# Patient Record
Sex: MALE | Race: WHITE | Employment: UNEMPLOYED | URBAN - METROPOLITAN AREA
[De-identification: names, ages, dates, MRNs, and addresses within clinical notes are randomized per-mention and may not be internally consistent; named-entity substitution may affect disease eponyms.]

---
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
## 2022-01-12 ENCOUNTER — HOSPITAL ENCOUNTER (EMERGENCY)
Age: 22
Discharge: HOME | End: 2022-01-12
Attending: EMERGENCY MEDICINE
Payer: MEDICAID

## 2022-01-12 ENCOUNTER — APPOINTMENT (OUTPATIENT)
Dept: GENERAL RADIOLOGY | Age: 22
End: 2022-01-12
Payer: MEDICAID

## 2022-01-12 VITALS
WEIGHT: 165 LBS | BODY MASS INDEX: 27.49 KG/M2 | TEMPERATURE: 96.9 F | OXYGEN SATURATION: 94 % | HEIGHT: 65 IN | SYSTOLIC BLOOD PRESSURE: 115 MMHG | RESPIRATION RATE: 18 BRPM | DIASTOLIC BLOOD PRESSURE: 74 MMHG | HEART RATE: 106 BPM

## 2022-01-12 DIAGNOSIS — M79.605 LEFT LEG PAIN: ICD-10-CM

## 2022-01-12 DIAGNOSIS — R07.89 OTHER CHEST PAIN: Primary | ICD-10-CM

## 2022-01-12 LAB
ABSOLUTE EOS #: 0.03 K/UL (ref 0–0.44)
ABSOLUTE IMMATURE GRANULOCYTE: 0.04 K/UL (ref 0–0.3)
ABSOLUTE LYMPH #: 1.43 K/UL (ref 1.2–5.2)
ABSOLUTE MONO #: 0.8 K/UL (ref 0.1–1.4)
ANION GAP SERPL CALCULATED.3IONS-SCNC: 14 MMOL/L (ref 9–17)
BASOPHILS # BLD: 1 % (ref 0–2)
BASOPHILS ABSOLUTE: 0.08 K/UL (ref 0–0.2)
BILIRUBIN URINE: NEGATIVE
BUN BLDV-MCNC: 10 MG/DL (ref 6–20)
BUN/CREAT BLD: ABNORMAL (ref 9–20)
CALCIUM SERPL-MCNC: 9.8 MG/DL (ref 8.6–10.4)
CHLORIDE BLD-SCNC: 97 MMOL/L (ref 98–107)
CO2: 25 MMOL/L (ref 20–31)
COLOR: YELLOW
COMMENT UA: ABNORMAL
CREAT SERPL-MCNC: 0.91 MG/DL (ref 0.7–1.2)
D-DIMER QUANTITATIVE: 0.3 MG/L FEU
DIFFERENTIAL TYPE: ABNORMAL
EOSINOPHILS RELATIVE PERCENT: 0 % (ref 1–4)
GFR AFRICAN AMERICAN: ABNORMAL ML/MIN
GFR NON-AFRICAN AMERICAN: ABNORMAL ML/MIN
GFR SERPL CREATININE-BSD FRML MDRD: ABNORMAL ML/MIN/{1.73_M2}
GFR SERPL CREATININE-BSD FRML MDRD: ABNORMAL ML/MIN/{1.73_M2}
GLUCOSE BLD-MCNC: 106 MG/DL (ref 70–99)
GLUCOSE URINE: NEGATIVE
HCT VFR BLD CALC: 43.6 % (ref 40.7–50.3)
HEMOGLOBIN: 14.6 G/DL (ref 13–17)
IMMATURE GRANULOCYTES: 1 %
KETONES, URINE: NEGATIVE
LEUKOCYTE ESTERASE, URINE: NEGATIVE
LYMPHOCYTES # BLD: 17 % (ref 25–45)
MCH RBC QN AUTO: 29.7 PG (ref 25.2–33.5)
MCHC RBC AUTO-ENTMCNC: 33.5 G/DL (ref 28.4–34.8)
MCV RBC AUTO: 88.8 FL (ref 82.6–102.9)
MONOCYTES # BLD: 9 % (ref 2–8)
NITRITE, URINE: NEGATIVE
NRBC AUTOMATED: 0 PER 100 WBC
PDW BLD-RTO: 12.5 % (ref 11.8–14.4)
PH UA: 6.5 (ref 5–8)
PLATELET # BLD: 309 K/UL (ref 138–453)
PLATELET ESTIMATE: ABNORMAL
PMV BLD AUTO: 10.4 FL (ref 8.1–13.5)
POTASSIUM SERPL-SCNC: 3.9 MMOL/L (ref 3.7–5.3)
PROTEIN UA: NEGATIVE
RBC # BLD: 4.91 M/UL (ref 4.21–5.77)
RBC # BLD: ABNORMAL 10*6/UL
SEG NEUTROPHILS: 72 % (ref 34–64)
SEGMENTED NEUTROPHILS ABSOLUTE COUNT: 6.15 K/UL (ref 1.8–8)
SODIUM BLD-SCNC: 136 MMOL/L (ref 135–144)
SPECIFIC GRAVITY UA: 1 (ref 1–1.03)
TROPONIN INTERP: NORMAL
TROPONIN T: NORMAL NG/ML
TROPONIN, HIGH SENSITIVITY: <6 NG/L (ref 0–22)
TURBIDITY: CLEAR
URINE HGB: NEGATIVE
UROBILINOGEN, URINE: NORMAL
WBC # BLD: 8.5 K/UL (ref 4.5–13.5)
WBC # BLD: ABNORMAL 10*3/UL

## 2022-01-12 PROCEDURE — 81003 URINALYSIS AUTO W/O SCOPE: CPT

## 2022-01-12 PROCEDURE — 96360 HYDRATION IV INFUSION INIT: CPT

## 2022-01-12 PROCEDURE — 99283 EMERGENCY DEPT VISIT LOW MDM: CPT

## 2022-01-12 PROCEDURE — 84484 ASSAY OF TROPONIN QUANT: CPT

## 2022-01-12 PROCEDURE — 85025 COMPLETE CBC W/AUTO DIFF WBC: CPT

## 2022-01-12 PROCEDURE — 2580000003 HC RX 258: Performed by: STUDENT IN AN ORGANIZED HEALTH CARE EDUCATION/TRAINING PROGRAM

## 2022-01-12 PROCEDURE — 80048 BASIC METABOLIC PNL TOTAL CA: CPT

## 2022-01-12 PROCEDURE — 96361 HYDRATE IV INFUSION ADD-ON: CPT

## 2022-01-12 PROCEDURE — 71045 X-RAY EXAM CHEST 1 VIEW: CPT

## 2022-01-12 PROCEDURE — 85379 FIBRIN DEGRADATION QUANT: CPT

## 2022-01-12 PROCEDURE — 93005 ELECTROCARDIOGRAM TRACING: CPT | Performed by: STUDENT IN AN ORGANIZED HEALTH CARE EDUCATION/TRAINING PROGRAM

## 2022-01-12 RX ORDER — 0.9 % SODIUM CHLORIDE 0.9 %
1000 INTRAVENOUS SOLUTION INTRAVENOUS ONCE
Status: COMPLETED | OUTPATIENT
Start: 2022-01-12 | End: 2022-01-12

## 2022-01-12 RX ADMIN — SODIUM CHLORIDE 1000 ML: 9 INJECTION, SOLUTION INTRAVENOUS at 17:07

## 2022-01-12 ASSESSMENT — PAIN DESCRIPTION - LOCATION: LOCATION: CHEST;LEG

## 2022-01-12 ASSESSMENT — PAIN DESCRIPTION - ORIENTATION: ORIENTATION: LEFT

## 2022-01-12 ASSESSMENT — PAIN SCALES - GENERAL: PAINLEVEL_OUTOF10: 8

## 2022-01-12 NOTE — ED PROVIDER NOTES
[facility]     Emergency Department     Faculty Attestation        I performed a history and physical examination of the patient and discussed management with the resident. I reviewed the residents note and agree with the documented findings and plan of care. Any areas of disagreement are noted on the chart. I was personally present for the key portions of any procedures. I have documented in the chart those procedures where I was not present during the key portions. I have reviewed the emergency nurses triage note. I agree with the chief complaint, past medical history, past surgical history, allergies, medications, social and family history as documented unless otherwise noted below. For Physician Assistant/ Nurse Practitioner cases/documentation I have personally evaluated this patient and have completed at least one if not all key elements of the E/M (history, physical exam, and MDM). Additional findings are as noted. Vital Signs: BP: 115/74  Pulse: 106  Resp: 18  Temp: 96.9 °F (36.1 °C) SpO2: 94 %  PCP:  No primary care provider on file. Pertinent Comments:     Patient is a 79-year-old male who ever since November when he thought he had COVID but multiple tests were negative he has had intermittent right-sided chest pain with some radiation into the left arm occasionally. States this is much worse while he is smoking drinking and eating spicy food. States he does have some \"sciatic pain\" as well intermittently as well but not related. No lower extremity swelling or calf tenderness to palpation at all. Neurovascular intact in all 4 extremities lungs clear to station bilaterally and heart regular rate and rhythm slightly tachycardic. Patient states he does have some shortness of breath occasionally with this as well but no diaphoresis or vomiting.    Assessment/plan: Patient with intermittent chest pain that occasionally appears

## 2022-01-12 NOTE — ED PROVIDER NOTES
[facility]  ED  Emergency Department Encounter  Emergency Medicine Resident     Pt Name: Holden Land  MRN: [id number]  Julianagflui 2000  Date of evaluation: 1/12/22  PCP:  No primary care provider on file. CHIEF COMPLAINT       Chief Complaint   Patient presents with    Chest Pain    Leg Pain     Left       HISTORY OFPRESENT ILLNESS  (Location/Symptom, Timing/Onset, Context/Setting, Quality, Duration, Modifying Jia Gordon.)      Holden Land is a 25 y.o. male who presents with concerns for chest pain since November as well as concerns for back pain rating down to the low left lower extremity to the toe with concern for potential sciatic pain. There is states that this chest pain has been occurring since November, describes as a pins-and-needles sensation located across the sternum, does notes worse with cough, deep breathing. Patient also states the patient's pain is made worse with smoking, drinking as well as when eating spicy foods. As well as concerns for lumbar back pain rating down to the left lower extremity tenderness to palpation of the anterior tibia. Patient is nonacute distress, states that his pain is an 8 out of 10 at its worst, states that he has mild pain at this time. Patient upon initial evaluation is not acute distress, denies nausea, vomiting, states he does not a primary care provider to follow-up with. Patient does state that he is attempting to decrease smoking, states that he currently vapes at this time. Patient does state that symptoms been occurring since November, thought that he had COVID at that time however had multiple negative tests. She denies shortness of breath at this time. PAST MEDICAL / SURGICAL / SOCIAL / FAMILY HISTORY      has no past medical history on file. has no past surgical history on file.      Social History     Socioeconomic History    Marital status: Single     Spouse name: Not on file    Number of children: Not on file  Years of education: Not on file    Highest education level: Not on file   Occupational History    Not on file   Tobacco Use    Smoking status: Never Smoker    Smokeless tobacco: Never Used   Substance and Sexual Activity    Alcohol use: No    Drug use: Yes     Types: Marijuana [place])    Sexual activity: Yes   Other Topics Concern    Not on file   Social History Narrative    Not on file     Social Determinants of Health     Financial Resource Strain:     Difficulty of Paying Living Expenses: Not on file   Food Insecurity:     Worried About Running Out of Food in the Last Year: Not on file    Clarisse of Food in the Last Year: Not on file   Transportation Needs:     Lack of Transportation (Medical): Not on file    Lack of Transportation (Non-Medical): Not on file   Physical Activity:     Days of Exercise per Week: Not on file    Minutes of Exercise per Session: Not on file   Stress:     Feeling of Stress : Not on file   Social Connections:     Frequency of Communication with Friends and Family: Not on file    Frequency of Social Gatherings with Friends and Family: Not on file    Attends Gnosticist Services: Not on file    Active Member of [address] [place] [address] or Organizations: Not on file    Attends Club or Organization Meetings: Not on file    Marital Status: Not on file   Intimate Partner Violence:     Fear of Current or Ex-Partner: Not on file    Emotionally Abused: Not on file    Physically Abused: Not on file    Sexually Abused: Not on file   Housing Stability:     Unable to Pay for Housing in the Last Year: Not on file    Number of [place] in the Last Year: Not on file    Unstable Housing in the Last Year: Not on file       No family history on file. Allergies:  Patient has no known allergies. Home Medications:  Prior to Admission medications    Medication Sig Start Date End Date Taking?  Authorizing Provider   ibuprofen (ADVIL;MOTRIN) 600 MG tablet Take 1 tablet by mouth every 8 hours as needed for Pain 10/11/19 10/15/19  Annmarie MARTINEZ Lump, APRN - CNP   acetaminophen-codeine (TYLENOL #3) 300-30 MG per tablet Take 1 tablet by mouth every 4 hours as needed for Pain 11/18/16   Tressa Blinks, APRN - CNP       REVIEW OFSYSTEMS    (2-9 systems for level 4, 10 or more for level 5)      Review of Systems   Constitutional: Negative for chills, diaphoresis, fatigue and fever. HENT: Negative for rhinorrhea, sore throat, tinnitus and trouble swallowing. Eyes: Negative for visual disturbance. Respiratory: Negative for cough, chest tightness, shortness of breath and wheezing. Cardiovascular: Positive for chest pain. Negative for leg swelling. Gastrointestinal: Negative for abdominal distention, abdominal pain, constipation, diarrhea, nausea and vomiting. Endocrine: Negative for polyuria. Genitourinary: Negative for dysuria, flank pain and frequency. Darkening of the urine   Musculoskeletal: Positive for myalgias (Pain rating down the left leg). Negative for arthralgias, back pain and joint swelling. Neurological: Negative for dizziness, tremors, seizures, weakness, light-headedness, numbness and headaches. PHYSICAL EXAM   (up to 7 for level 4, 8 or more forlevel 5)      INITIAL VITALS:   ED Triage Vitals [01/12/22 1537]   BP Temp Temp Source Pulse Resp SpO2 Height Weight   115/74 96.9 °F (36.1 °C) Temporal 106 18 94 % 5' 5\" (1.651 m) 165 lb (74.8 kg)       Physical Exam  Constitutional:       General: He is not in acute distress. Appearance: He is not ill-appearing. HENT:      Head: Normocephalic and atraumatic. Right Ear: External ear normal.      Left Ear: External ear normal.   Eyes:      Extraocular Movements: Extraocular movements intact. Cardiovascular:      Rate and Rhythm: Regular rhythm. Tachycardia present. Pulmonary:      Effort: Pulmonary effort is normal.   Abdominal:      General: Abdomen is flat.    Musculoskeletal:         General: No deformity or signs of injury. Comments: Tenderness to the anterior tibia, mild tenderness behind the knee left lower extremity   Skin:     General: Skin is warm. Capillary Refill: Capillary refill takes less than 2 seconds. Neurological:      Mental Status: He is oriented to person, place, and time. Mental status is at baseline. Psychiatric:         Mood and Affect: Mood normal.         DIFFERENTIAL  DIAGNOSIS     PLAN (LABS / IMAGING / EKG):  Orders Placed This Encounter   Procedures    XR CHEST PORTABLE    CBC Auto Differential    Basic Metabolic Panel w/ Reflex to MG    Urinalysis Reflex to Culture    Troponin    D-DIMER, QUANTITATIVE    EKG 12 Lead       MEDICATIONS ORDERED:  Orders Placed This Encounter   Medications    0.9 % sodium chloride bolus       DDX: Dehydration, pulmonary embolism, DVT, musculoskeletal strain    Initial MDM/Plan/ED COURSE:    25 y.o. male who presents with concerns for cough since November as well as back pain rating down the left lower extremity. Patient is notably tachycardic upon initial evaluation, patient has no tenderness to calf but does have little bit tenderness behind the left knee. With patient's tachycardia, chest pain, left leg pain plan to get D-dimer to assess for signs of acute clotting. Plan to get a singular troponin as well as an EKG in the setting of chest pain intermittent since November, plan to get a CBC to assess for anemia, elevated white blood cell count, pending a chest x-ray to assess for signs of pneumonia, enlargement of the cardiac silhouette. Negative BNP and reassess for renal function, electrolyte abnormality in the setting of darkening of urine as well as assess urinalysis and assess for infection, blood in the urine.     ED Course as of 01/13/22 0911   Wed Jan 12, 2022 1957 D-Dimer, Quant: 0.30 [JS]      ED Course User Index  [JS] Kian Brooke DO   Patient currently pending D-dimer at this time, patient care handoff to Dr. Kirill Johnson sent care.  If D-dimer is positive plan to either treat the patient symptomatically with Lovenox, get a bilateral venous Doppler in the morning.:     DIAGNOSTIC RESULTS / EMERGENCYDEPARTMENT COURSE / MDM     LABS:  Labs Reviewed   CBC WITH AUTO DIFFERENTIAL - Abnormal; Notable for the following components:       Result Value    Seg Neutrophils 72 (*)     Lymphocytes 17 (*)     Monocytes 9 (*)     Eosinophils % 0 (*)     Immature Granulocytes 1 (*)     All other components within normal limits   BASIC METABOLIC PANEL W/ REFLEX TO MG FOR LOW K - Abnormal; Notable for the following components:    Glucose 106 (*)     Chloride 97 (*)     All other components within normal limits   URINE RT REFLEX TO CULTURE - Abnormal; Notable for the following components:    Specific Gravity, UA 1.003 (*)     All other components within normal limits   TROPONIN   D-DIMER, QUANTITATIVE           XR CHEST PORTABLE    Result Date: 1/12/2022  EXAMINATION: ONE XRAY VIEW OF THE CHEST 1/12/2022 3:28 pm COMPARISON: 09/28/2021 HISTORY: ORDERING SYSTEM PROVIDED HISTORY: chest pain, intermittent TECHNOLOGIST PROVIDED HISTORY: chest pain, intermittent Reason for Exam: chest pain upright port FINDINGS: The lungs are without acute focal process. There is no effusion or pneumothorax. The cardiomediastinal silhouette is stable. The osseous structures are stable. No acute process. EKG  Normal sinus, normal axis, tachycardia, no ST elevation depression, nonspecific EKG. All EKG's are interpreted by the Emergency Department Physicianwho either signs or Co-signs this chart in the absence of a cardiologist.      PROCEDURES:  None    CONSULTS:  None    CRITICAL CARE:  Please see attending note    FINAL IMPRESSION      1. Other chest pain    2.  Left leg pain          DISPOSITION / PLAN     DISPOSITION Decision To Discharge 01/12/2022 07:59:44 PM      PATIENT REFERRED TO:  [facility]  [address] [facility] [address]  [phone]    If symptoms worsen      DISCHARGE MEDICATIONS:  Discharge Medication List as of 1/12/2022  8:01 PM          Anatoly Perdomo MD  Emergency Medicine Resident    (Please note that portions of this note were completed with a voice recognition program.Efforts were made to edit the dictations but occasionally words are mis-transcribed.)        Anatoly Perdomo MD  Resident  01/13/22 8413

## 2022-01-12 NOTE — ED TRIAGE NOTES
Pt presented to the ED today with c/o left leg pain and chest pain. Pt states that the leg pain started before Thanksgiving. Pt states that the chest pain started November 10th. Pt stated that he just got over [facility]. After having COVID the chest pain worsened.

## 2022-01-12 NOTE — ED NOTES
This patient was assessed by the doctor only. Nurse processed and completed the orders from this doctor ie labs, meds, and/or EKG.         Marilyn Blackwell RN  01/12/22 3368

## 2022-01-13 LAB
EKG ATRIAL RATE: 106 BPM
EKG P AXIS: 54 DEGREES
EKG P-R INTERVAL: 118 MS
EKG Q-T INTERVAL: 330 MS
EKG QRS DURATION: 72 MS
EKG QTC CALCULATION (BAZETT): 438 MS
EKG R AXIS: 66 DEGREES
EKG T AXIS: 32 DEGREES
EKG VENTRICULAR RATE: 106 BPM

## 2022-01-13 PROCEDURE — 93010 ELECTROCARDIOGRAM REPORT: CPT | Performed by: INTERNAL MEDICINE

## 2022-01-13 ASSESSMENT — ENCOUNTER SYMPTOMS
ABDOMINAL PAIN: 0
CONSTIPATION: 0
ABDOMINAL DISTENTION: 0
SHORTNESS OF BREATH: 0
DIARRHEA: 0
VOMITING: 0
RHINORRHEA: 0
BACK PAIN: 0
COUGH: 0
WHEEZING: 0
SORE THROAT: 0
TROUBLE SWALLOWING: 0
CHEST TIGHTNESS: 0
NAUSEA: 0

## 2022-01-13 NOTE — ED PROVIDER NOTES
[facility] [facility]  Emergency Department  Emergency Medicine Resident Sign-out     Care of Janell Griffin was assumed from Dr. Henrietta Crabtree and is being seen for Chest Pain and Leg Pain (Left)  . The patient's initial evaluation and plan have been discussed with the prior provider who initially evaluated the patient. EMERGENCY DEPARTMENT COURSE / MEDICAL DECISION MAKING:       MEDICATIONS GIVEN:  Orders Placed This Encounter   Medications    0.9 % sodium chloride bolus       LABS / RADIOLOGY:     Labs Reviewed   CBC WITH AUTO DIFFERENTIAL - Abnormal; Notable for the following components:       Result Value    Seg Neutrophils 72 (*)     Lymphocytes 17 (*)     Monocytes 9 (*)     Eosinophils % 0 (*)     Immature Granulocytes 1 (*)     All other components within normal limits   BASIC METABOLIC PANEL W/ REFLEX TO MG FOR LOW K - Abnormal; Notable for the following components:    Glucose 106 (*)     Chloride 97 (*)     All other components within normal limits   URINE RT REFLEX TO CULTURE - Abnormal; Notable for the following components:    Specific Gravity, UA 1.003 (*)     All other components within normal limits   TROPONIN   D-DIMER, QUANTITATIVE       XR CHEST PORTABLE   Final Result   No acute process. RECENT VITALS:     Temp: 96.9 °F (36.1 °C),  Pulse: 106, Resp: 18, BP: 115/74, SpO2: 94 %    This patient is a 25 y.o. Male with concerns for 2 isolated complaints of patient having chest pain since November and worse with coughing, deep breathing as well as concerns for back pain rating down to the great toe of the left lower extremity as well as tenderness to palpation of the anterior tibia. Patient upon initial evaluation is tachycardic, D-dimer currently pending at this time with concerns for potential thrombotic etiology of tachycardia, pain of the chest, leg. Patient treated with 1 L normal saline.   Disposition secondary to D-dimer, if D-dimer positive, would recommend CT pulmonary embolism as well as potential admission for Doppler ultrasound bilateral lower extremities versus treatment of potential DVT and follow-up in the morning for DVT sonographic evaluation. D-dimer was negative. Patient updated on findings. We discussed possible etiologies of his pain and certain stretches that could help with tight muscles that would be causing the pain. Patient discharged home in stable condition. OUTSTANDING TASKS / RECOMMENDATIONS:      1. F/u d-dimer  2. dispo pending workup     FINAL IMPRESSION:     No diagnosis found. DISPOSITION:       DISPOSITION:  [x]  Discharge   []  Transfer -    []  Admission -     []  Against Medical Advice   []  Eloped   FOLLOW-UP: No follow-up provider specified.    DISCHARGE MEDICATIONS: New Prescriptions    No medications on file          Demarcus England,   Emergency Medicine Resident  [facility]       Demarcus Jimenezt, [place]  Resident  01/13/22 9452

## 2022-01-13 NOTE — ED PROVIDER NOTES
Faculty Sign-Out Attestation  Handoff taken on the following patient from prior Attending Physician: Gladys Gray    I was available and discussed any additional care issues that arose and coordinated the management plans with the resident(s) caring for the patient during my duty period. Any areas of disagreement with residents documentation of care or procedures are noted on the chart. I was personally present for the key portions of any/all procedures during my duty period. I have documented in the chart those procedures where I was not present during the key portions. XR CHEST PORTABLE   Final Result   No acute process.                Nyla Moya MD  Attending Physician       Nyla Moya MD  01/12/22 2007

## 2022-03-30 ENCOUNTER — HOSPITAL ENCOUNTER (EMERGENCY)
Age: 22
Discharge: HOME | End: 2022-03-30
Attending: EMERGENCY MEDICINE
Payer: MEDICAID

## 2022-03-30 ENCOUNTER — APPOINTMENT (OUTPATIENT)
Dept: ULTRASOUND IMAGING | Age: 22
End: 2022-03-30
Payer: MEDICAID

## 2022-03-30 VITALS
TEMPERATURE: 98.9 F | HEIGHT: 64 IN | DIASTOLIC BLOOD PRESSURE: 80 MMHG | HEART RATE: 91 BPM | RESPIRATION RATE: 13 BRPM | WEIGHT: 146 LBS | BODY MASS INDEX: 24.92 KG/M2 | SYSTOLIC BLOOD PRESSURE: 123 MMHG | OXYGEN SATURATION: 97 %

## 2022-03-30 DIAGNOSIS — R10.13 EPIGASTRIC PAIN: Primary | ICD-10-CM

## 2022-03-30 LAB
ABSOLUTE EOS #: 0.06 K/UL (ref 0–0.44)
ABSOLUTE IMMATURE GRANULOCYTE: <0.03 K/UL (ref 0–0.3)
ABSOLUTE LYMPH #: 2.29 K/UL (ref 1.2–5.2)
ABSOLUTE MONO #: 0.6 K/UL (ref 0.1–1.4)
ALBUMIN SERPL-MCNC: 4.9 G/DL (ref 3.5–5.2)
ALBUMIN/GLOBULIN RATIO: 1.5 (ref 1–2.5)
ALP BLD-CCNC: 63 U/L (ref 40–129)
ALT SERPL-CCNC: 23 U/L (ref 5–41)
ANION GAP SERPL CALCULATED.3IONS-SCNC: 12 MMOL/L (ref 9–17)
AST SERPL-CCNC: 20 U/L
BASOPHILS # BLD: 1 % (ref 0–2)
BASOPHILS ABSOLUTE: 0.07 K/UL (ref 0–0.2)
BILIRUB SERPL-MCNC: 0.63 MG/DL (ref 0.3–1.2)
BUN BLDV-MCNC: 10 MG/DL (ref 6–20)
CALCIUM SERPL-MCNC: 9.7 MG/DL (ref 8.6–10.4)
CHLORIDE BLD-SCNC: 98 MMOL/L (ref 98–107)
CO2: 26 MMOL/L (ref 20–31)
CREAT SERPL-MCNC: 0.86 MG/DL (ref 0.7–1.2)
EOSINOPHILS RELATIVE PERCENT: 1 % (ref 1–4)
GFR AFRICAN AMERICAN: >60 ML/MIN
GFR NON-AFRICAN AMERICAN: >60 ML/MIN
GFR SERPL CREATININE-BSD FRML MDRD: ABNORMAL ML/MIN/{1.73_M2}
GLUCOSE BLD-MCNC: 120 MG/DL (ref 70–99)
HCT VFR BLD CALC: 40 % (ref 40.7–50.3)
HEMOGLOBIN: 14 G/DL (ref 13–17)
IMMATURE GRANULOCYTES: 0 %
LIPASE: 24 U/L (ref 13–60)
LYMPHOCYTES # BLD: 27 % (ref 25–45)
MCH RBC QN AUTO: 31 PG (ref 25.2–33.5)
MCHC RBC AUTO-ENTMCNC: 35 G/DL (ref 28.4–34.8)
MCV RBC AUTO: 88.7 FL (ref 82.6–102.9)
MONOCYTES # BLD: 7 % (ref 2–8)
NRBC AUTOMATED: 0 PER 100 WBC
PDW BLD-RTO: 12.3 % (ref 11.8–14.4)
PLATELET # BLD: 288 K/UL (ref 138–453)
PMV BLD AUTO: 10.4 FL (ref 8.1–13.5)
POTASSIUM SERPL-SCNC: 3.6 MMOL/L (ref 3.7–5.3)
RBC # BLD: 4.51 M/UL (ref 4.21–5.77)
SEG NEUTROPHILS: 64 % (ref 34–64)
SEGMENTED NEUTROPHILS ABSOLUTE COUNT: 5.58 K/UL (ref 1.8–8)
SODIUM BLD-SCNC: 136 MMOL/L (ref 135–144)
TOTAL PROTEIN: 8.1 G/DL (ref 6.4–8.3)
WBC # BLD: 8.6 K/UL (ref 4.5–13.5)

## 2022-03-30 PROCEDURE — 85025 COMPLETE CBC W/AUTO DIFF WBC: CPT

## 2022-03-30 PROCEDURE — 76870 US EXAM SCROTUM: CPT

## 2022-03-30 PROCEDURE — 83690 ASSAY OF LIPASE: CPT

## 2022-03-30 PROCEDURE — 80053 COMPREHEN METABOLIC PANEL: CPT

## 2022-03-30 PROCEDURE — 99285 EMERGENCY DEPT VISIT HI MDM: CPT

## 2022-03-30 PROCEDURE — 96374 THER/PROPH/DIAG INJ IV PUSH: CPT

## 2022-03-30 PROCEDURE — 2580000003 HC RX 258: Performed by: EMERGENCY MEDICINE

## 2022-03-30 PROCEDURE — 96361 HYDRATE IV INFUSION ADD-ON: CPT

## 2022-03-30 PROCEDURE — 6360000002 HC RX W HCPCS: Performed by: EMERGENCY MEDICINE

## 2022-03-30 RX ORDER — KETOROLAC TROMETHAMINE 30 MG/ML
30 INJECTION, SOLUTION INTRAMUSCULAR; INTRAVENOUS ONCE
Status: COMPLETED | OUTPATIENT
Start: 2022-03-30 | End: 2022-03-30

## 2022-03-30 RX ORDER — SODIUM CHLORIDE, SODIUM LACTATE, POTASSIUM CHLORIDE, AND CALCIUM CHLORIDE .6; .31; .03; .02 G/100ML; G/100ML; G/100ML; G/100ML
1000 INJECTION, SOLUTION INTRAVENOUS ONCE
Status: COMPLETED | OUTPATIENT
Start: 2022-03-30 | End: 2022-03-30

## 2022-03-30 RX ADMIN — SODIUM CHLORIDE, POTASSIUM CHLORIDE, SODIUM LACTATE AND CALCIUM CHLORIDE 1000 ML: 600; 310; 30; 20 INJECTION, SOLUTION INTRAVENOUS at 18:41

## 2022-03-30 RX ADMIN — KETOROLAC TROMETHAMINE 30 MG: 30 INJECTION, SOLUTION INTRAMUSCULAR at 19:44

## 2022-03-30 ASSESSMENT — ENCOUNTER SYMPTOMS
CONSTIPATION: 1
COLOR CHANGE: 0
ABDOMINAL PAIN: 1
CHEST TIGHTNESS: 0
VOMITING: 0
DIARRHEA: 0
NAUSEA: 0
SHORTNESS OF BREATH: 0

## 2022-03-30 ASSESSMENT — PAIN - FUNCTIONAL ASSESSMENT: PAIN_FUNCTIONAL_ASSESSMENT: 0-10

## 2022-03-30 ASSESSMENT — PAIN DESCRIPTION - LOCATION: LOCATION: ABDOMEN

## 2022-03-30 ASSESSMENT — PAIN SCALES - GENERAL: PAINLEVEL_OUTOF10: 6

## 2022-03-30 NOTE — ED NOTES
Pt transported to [address] [place] [address],[address] via [address] [place], [place] [address]d, [address] [place] [address]  03/30/22 1463

## 2022-03-30 NOTE — ED PROVIDER NOTES
[facility]     Emergency Department     Faculty Attestation    I performed a history and physical examination of the patient and discussed management with the resident. I reviewed the resident´s note and agree with the documented findings and plan of care. Any areas of disagreement are noted on the chart. I was personally present for the key portions of any procedures. I have documented in the chart those procedures where I was not present during the key portions. I have reviewed the emergency nurses triage note. I agree with the chief complaint, past medical history, past surgical history, allergies, medications, social and family history as documented unless otherwise noted below. For Physician Assistant/ Nurse Practitioner cases/documentation I have personally evaluated this patient and have completed at least one if not all key elements of the E/M (history, physical exam, and MDM). Additional findings are as noted. Intermittent abdominal pain for the last 2 months, patient experienced abdominal pain while running today. Mild right upper quadrant abdominal pain without Vazquez sign, no pain at McBurney's point, no guarding or rebounding, mild pain left testicle.      Capri Munguia MD  03/30/22 0325

## 2022-03-30 NOTE — PROGRESS NOTES
I did not see patient. I clicked on patient in error. Patient was seen by fellow co-resident.       Electronically signed by Kira Cassidy MD on 3/30/2022 at 6:44 PM

## 2022-03-30 NOTE — ED NOTES
Writer at bedside as a witness for a testicular exam w Dr. Charles Coy.       Holden Mays RN  03/30/22 8243

## 2022-03-30 NOTE — ED NOTES
Pt arrived to ED with c/o abd pain. Pt reports pain x5 months and reports increased pain today after running. Pt reports pale stools. Pt denies urinary change. Pt reports he was seen in November for constipation and blood in stool and reports he was prescribed stool softener which he reports taking as prescribed. Pt also reports some testicular pain. Pt denies and N/V/D, pt A&Ox4, RR even/unlabored, pt ambulatory without assistance.  Call light within reach, pt provided with warm blanket        Keon Abebe RN  03/30/22 0564

## 2022-03-30 NOTE — ED PROVIDER NOTES
[facility]  ED  Emergency Department Encounter  EmergencyMedicine Resident     Pt Name:Conor Peterson  MRN: [id number]  [place] 2000  Date of evaluation: 3/30/22  PCP:  No primary care provider on file. CHIEF COMPLAINT       Chief Complaint   Patient presents with    Abdominal Pain       HISTORY OF PRESENT ILLNESS  (Location/Symptom, Timing/Onset, Context/Setting, Quality, Duration, Modifying Factors, Severity.)      Jarod Mcdowell is a 23 y.o. male who presents with mild abdominal pain that has been going on off and on for multiple months. Patient states that it was exacerbated today while running, noted more towards the end of morning, noted that his heart was racing. Patient denies any associated nausea or vomiting. Patient states that he had abdominal pain back in November, was noted with diarrhea and concerns for bright red blood. Patient had constipation off and on for last couple months, started took MiraLAX, concerns for p.o. stools. Patient denies any lightheadedness, dizziness, chest pain, shortness of breath, change in urination or bowel habits at this time. Patient has had no abdominal surgeries, still has his appendix and gallbladder. Patient has a history of appendicitis and gallbladder issues in his family. PAST MEDICAL / SURGICAL / SOCIAL / FAMILY HISTORY      has no past medical history on file. No additional pertinent     has no past surgical history on file.   No additional pertinent    Social History     Socioeconomic History    Marital status: Single     Spouse name: Not on file    Number of children: Not on file    Years of education: Not on file    Highest education level: Not on file   Occupational History    Not on file   Tobacco Use    Smoking status: Never Smoker    Smokeless tobacco: Never Used   Substance and Sexual Activity    Alcohol use: Not on file    Drug use: Yes     Types: Marijuana Myrtis Regulus)    Sexual activity: Yes   Other Topics Concern  Not on file   Social History Narrative    Not on file     Social Determinants of Health     Financial Resource Strain:     Difficulty of Paying Living Expenses: Not on file   Food Insecurity:     Worried About Running Out of Food in the Last Year: Not on file    Clarisse of Food in the Last Year: Not on file   Transportation Needs:     Lack of Transportation (Medical): Not on file    Lack of Transportation (Non-Medical): Not on file   Physical Activity:     Days of Exercise per Week: Not on file    Minutes of Exercise per Session: Not on file   Stress:     Feeling of Stress : Not on file   Social Connections:     Frequency of Communication with Friends and Family: Not on file    Frequency of Social Gatherings with Friends and Family: Not on file    Attends Catholic Services: Not on file    Active Member of [address] [place] [address] [employer] or Organizations: Not on file    Attends Club or Organization Meetings: Not on file    Marital Status: Not on file   Intimate Partner Violence:     Fear of Current or Ex-Partner: Not on file    Emotionally Abused: Not on file    Physically Abused: Not on file    Sexually Abused: Not on file   Housing Stability:     Unable to Pay for Housing in the Last Year: Not on file    Number of [place] in the Last Year: Not on file    Unstable Housing in the Last Year: Not on file       History reviewed. No pertinent family history. Allergies:  Patient has no known allergies. Home Medications:  Prior to Admission medications    Medication Sig Start Date End Date Taking?  Authorizing Provider   ibuprofen (ADVIL;MOTRIN) 600 MG tablet Take 1 tablet by mouth every 8 hours as needed for Pain 10/11/19 10/15/19  CARLOS Cavazos - CNP   acetaminophen-codeine (TYLENOL #3) 300-30 MG per tablet Take 1 tablet by mouth every 4 hours as needed for Pain 11/18/16   CARLOS Chamberlain - CNP       REVIEW OF SYSTEMS    (2-9 systems for level 4, 10 or more for level 5)      Review of Systems Constitutional: Negative for chills and fever. Respiratory: Negative for chest tightness and shortness of breath. Cardiovascular: Negative for chest pain. Gastrointestinal: Positive for abdominal pain and constipation. Negative for diarrhea, nausea and vomiting. Patient described pale stools that is been going on for couple months since taking Maalox. Endocrine: Negative for polyuria. Genitourinary: Negative for difficulty urinating. Skin: Negative for color change. Neurological: Negative for dizziness, light-headedness and headaches. PHYSICAL EXAM   (up to 7 for level 4, 8 or more for level 5)      INITIAL VITALS:   /80   Pulse 91   Temp 98.9 °F (37.2 °C) (Oral)   Resp 13   Ht 5' 4\" (1.626 m)   Wt 146 lb (66.2 kg)   SpO2 97%   BMI 25.06 kg/m²     Physical Exam  Constitutional:       Appearance: Normal appearance. HENT:      Head: Normocephalic and atraumatic. Mouth/Throat:      Mouth: Mucous membranes are moist.      Pharynx: Oropharynx is clear. Eyes:      Extraocular Movements: Extraocular movements intact. Conjunctiva/sclera: Conjunctivae normal.   Cardiovascular:      Rate and Rhythm: Normal rate and regular rhythm. Pulses: Normal pulses. Heart sounds: Normal heart sounds. No murmur heard. Pulmonary:      Effort: Pulmonary effort is normal.      Breath sounds: Normal breath sounds. Abdominal:      General: Bowel sounds are normal. There is no distension. Tenderness: There is no abdominal tenderness. There is no guarding or rebound. Negative signs include Vazquez's sign, Rovsing's sign and McBurney's sign. Genitourinary:     Testes:         Right: Tenderness not present. Left: Tenderness present. Musculoskeletal:         General: Normal range of motion. Comments: Range of motion noted to be normal with patient's natural movements   Skin:     General: Skin is warm and dry. Findings: No rash (On exposed skin). Neurological:      General: No focal deficit present. Mental Status: He is alert and oriented to person, place, and time.    Psychiatric:         Mood and Affect: Mood normal.         Behavior: Behavior normal.         DIFFERENTIAL  DIAGNOSIS     PLAN (LABS / IMAGING / EKG):  Orders Placed This Encounter   Procedures    US SCROTUM W LIMITED DUPLEX    CBC with Auto Differential    Comprehensive Metabolic Panel w/ Reflex to MG    Lipase    Insert peripheral IV       MEDICATIONS ORDERED:  Orders Placed This Encounter   Medications    lactated ringers bolus    ketorolac (TORADOL) injection 30 mg         DIAGNOSTIC RESULTS / EMERGENCY DEPARTMENT COURSE / MDM   LAB RESULTS:  Results for orders placed or performed during the hospital encounter of 03/30/22   CBC with Auto Differential   Result Value Ref Range    WBC 8.6 4.5 - 13.5 k/uL    RBC 4.51 4.21 - 5.77 m/uL    Hemoglobin 14.0 13.0 - 17.0 g/dL    Hematocrit 40.0 (L) 40.7 - 50.3 %    MCV 88.7 82.6 - 102.9 fL    MCH 31.0 25.2 - 33.5 pg    MCHC 35.0 (H) 28.4 - 34.8 g/dL    RDW 12.3 11.8 - 14.4 %    Platelets 739 476 - 183 k/uL    MPV 10.4 8.1 - 13.5 fL    NRBC Automated 0.0 0.0 per 100 WBC    Seg Neutrophils 64 34 - 64 %    Lymphocytes 27 25 - 45 %    Monocytes 7 2 - 8 %    Eosinophils % 1 1 - 4 %    Basophils 1 0 - 2 %    Immature Granulocytes 0 0 %    Segs Absolute 5.58 1.80 - 8.00 k/uL    Absolute Lymph # 2.29 1.20 - 5.20 k/uL    Absolute Mono # 0.60 0.10 - 1.40 k/uL    Absolute Eos # 0.06 0.00 - 0.44 k/uL    Basophils Absolute 0.07 0.00 - 0.20 k/uL    Absolute Immature Granulocyte <0.03 0.00 - 0.30 k/uL   Comprehensive Metabolic Panel w/ Reflex to MG   Result Value Ref Range    Glucose 120 (H) 70 - 99 mg/dL    BUN 10 6 - 20 mg/dL    CREATININE 0.86 0.70 - 1.20 mg/dL    Calcium 9.7 8.6 - 10.4 mg/dL    Sodium 136 135 - 144 mmol/L    Potassium 3.6 (L) 3.7 - 5.3 mmol/L    Chloride 98 98 - 107 mmol/L    CO2 26 20 - 31 mmol/L    Anion Gap 12 9 - 17 mmol/L Alkaline Phosphatase 63 40 - 129 U/L    ALT 23 5 - 41 U/L    AST 20 <40 U/L    Total Bilirubin 0.63 0.3 - 1.2 mg/dL    Total Protein 8.1 6.4 - 8.3 g/dL    Albumin 4.9 3.5 - 5.2 g/dL    Albumin/Globulin Ratio 1.5 1.0 - 2.5    GFR Non-African American >60 >60 mL/min    GFR African American >60 >60 mL/min    GFR Comment         Lipase   Result Value Ref Range    Lipase 24 13 - 60 U/L       RADIOLOGY:  US SCROTUM W LIMITED DUPLEX   Final Result   1. Normal sonographic appearance of both testes. Normal Doppler flow. 2.  Bilateral varicoceles and hydroceles. PROCEDURES:  none    CONSULTS:  None    MEDICAL DECISION MAKING:  Patient presenting with acute onset abdominal pain while running today, patient has a history of abdominal pain over the last couple months has been chronic in nature. Patient has concerns for cholecystitis, as he has a history of his family. Patient had negative Vazquez sign, abdominal evaluation benign, abdomen soft nontender on evaluation. Patient did have testicular tenderness on evaluation, did have cremasteric reflex bilaterally, testicles nonerythematous, nonswollen, normal appearance. Nurse Nicolas chaperone evaluation. With left testicular tenderness on evaluation, concern for torsion due to patient's abdominal pain starting while running. Ultrasound evaluation demonstrated concerns for minor variceal and hydrocele but no evidence for torsion and normal sonographic appearance of both testes noted. Patient's pain improved with Toradol. Patient had laboratory evaluations that demonstrated normal kidney function, normal liver function, no elevated bilirubin, no elevated lipase and concerns for pancreatitis not likely.   Patient does have concerns for heavy alcohol consumption, discussed with patient his use, states that he has had about 2 beers a week, had extensive conversation with patient about reducing his drinking habits, cholecystitis, pancreatitis, and other

## 2022-04-07 ENCOUNTER — APPOINTMENT (OUTPATIENT)
Dept: GENERAL RADIOLOGY | Age: 22
End: 2022-04-07
Payer: MEDICAID

## 2022-04-07 ENCOUNTER — HOSPITAL ENCOUNTER (EMERGENCY)
Age: 22
Discharge: HOME | End: 2022-04-07
Attending: EMERGENCY MEDICINE
Payer: MEDICAID

## 2022-04-07 VITALS
DIASTOLIC BLOOD PRESSURE: 89 MMHG | OXYGEN SATURATION: 98 % | RESPIRATION RATE: 16 BRPM | TEMPERATURE: 99.4 F | WEIGHT: 146.6 LBS | BODY MASS INDEX: 25.03 KG/M2 | HEART RATE: 78 BPM | SYSTOLIC BLOOD PRESSURE: 122 MMHG | HEIGHT: 64 IN

## 2022-04-07 DIAGNOSIS — R20.8 ELECTRICAL SHOCK SENSATION: Primary | ICD-10-CM

## 2022-04-07 LAB
ABSOLUTE EOS #: 0.14 K/UL (ref 0–0.44)
ABSOLUTE IMMATURE GRANULOCYTE: <0.03 K/UL (ref 0–0.3)
ABSOLUTE LYMPH #: 3.54 K/UL (ref 1.2–5.2)
ABSOLUTE MONO #: 0.77 K/UL (ref 0.1–1.4)
ANION GAP SERPL CALCULATED.3IONS-SCNC: 12 MMOL/L (ref 9–17)
BASOPHILS # BLD: 1 % (ref 0–2)
BASOPHILS ABSOLUTE: 0.08 K/UL (ref 0–0.2)
BUN BLDV-MCNC: 14 MG/DL (ref 6–20)
CALCIUM SERPL-MCNC: 9.7 MG/DL (ref 8.6–10.4)
CHLORIDE BLD-SCNC: 97 MMOL/L (ref 98–107)
CO2: 27 MMOL/L (ref 20–31)
CREAT SERPL-MCNC: 0.9 MG/DL (ref 0.7–1.2)
EKG ATRIAL RATE: 69 BPM
EKG P AXIS: 43 DEGREES
EKG P-R INTERVAL: 128 MS
EKG Q-T INTERVAL: 380 MS
EKG QRS DURATION: 80 MS
EKG QTC CALCULATION (BAZETT): 407 MS
EKG R AXIS: 54 DEGREES
EKG T AXIS: 42 DEGREES
EKG VENTRICULAR RATE: 69 BPM
EOSINOPHILS RELATIVE PERCENT: 2 % (ref 1–4)
GFR AFRICAN AMERICAN: >60 ML/MIN
GFR NON-AFRICAN AMERICAN: >60 ML/MIN
GFR SERPL CREATININE-BSD FRML MDRD: ABNORMAL ML/MIN/{1.73_M2}
GLUCOSE BLD-MCNC: 100 MG/DL (ref 70–99)
HCT VFR BLD CALC: 42.7 % (ref 40.7–50.3)
HEMOGLOBIN: 14.6 G/DL (ref 13–17)
IMMATURE GRANULOCYTES: 0 %
LYMPHOCYTES # BLD: 39 % (ref 25–45)
MAGNESIUM: 2.2 MG/DL (ref 1.6–2.6)
MCH RBC QN AUTO: 30.5 PG (ref 25.2–33.5)
MCHC RBC AUTO-ENTMCNC: 34.2 G/DL (ref 28.4–34.8)
MCV RBC AUTO: 89.3 FL (ref 82.6–102.9)
MONOCYTES # BLD: 8 % (ref 2–8)
NRBC AUTOMATED: 0 PER 100 WBC
PDW BLD-RTO: 12.2 % (ref 11.8–14.4)
PLATELET # BLD: 335 K/UL (ref 138–453)
PMV BLD AUTO: 9.5 FL (ref 8.1–13.5)
POTASSIUM SERPL-SCNC: 3.9 MMOL/L (ref 3.7–5.3)
PRO-BNP: <20 PG/ML
RBC # BLD: 4.78 M/UL (ref 4.21–5.77)
SEG NEUTROPHILS: 50 % (ref 34–64)
SEGMENTED NEUTROPHILS ABSOLUTE COUNT: 4.59 K/UL (ref 1.8–8)
SODIUM BLD-SCNC: 136 MMOL/L (ref 135–144)
TROPONIN, HIGH SENSITIVITY: <6 NG/L (ref 0–22)
WBC # BLD: 9.1 K/UL (ref 4.5–13.5)

## 2022-04-07 PROCEDURE — 83735 ASSAY OF MAGNESIUM: CPT

## 2022-04-07 PROCEDURE — 93010 ELECTROCARDIOGRAM REPORT: CPT | Performed by: INTERNAL MEDICINE

## 2022-04-07 PROCEDURE — 80048 BASIC METABOLIC PNL TOTAL CA: CPT

## 2022-04-07 PROCEDURE — 83880 ASSAY OF NATRIURETIC PEPTIDE: CPT

## 2022-04-07 PROCEDURE — 84484 ASSAY OF TROPONIN QUANT: CPT

## 2022-04-07 PROCEDURE — 71045 X-RAY EXAM CHEST 1 VIEW: CPT

## 2022-04-07 PROCEDURE — 99285 EMERGENCY DEPT VISIT HI MDM: CPT

## 2022-04-07 PROCEDURE — 93005 ELECTROCARDIOGRAM TRACING: CPT | Performed by: STUDENT IN AN ORGANIZED HEALTH CARE EDUCATION/TRAINING PROGRAM

## 2022-04-07 PROCEDURE — 85025 COMPLETE CBC W/AUTO DIFF WBC: CPT

## 2022-04-07 ASSESSMENT — ENCOUNTER SYMPTOMS
NAUSEA: 0
VOMITING: 0
ABDOMINAL DISTENTION: 0
ABDOMINAL PAIN: 0
SHORTNESS OF BREATH: 0
DIARRHEA: 0
COUGH: 0
RHINORRHEA: 0
EYE REDNESS: 0

## 2022-04-07 ASSESSMENT — PAIN DESCRIPTION - DESCRIPTORS: DESCRIPTORS: BURNING

## 2022-04-07 ASSESSMENT — PAIN DESCRIPTION - ONSET: ONSET: SUDDEN

## 2022-04-07 ASSESSMENT — PAIN DESCRIPTION - LOCATION: LOCATION: JAW

## 2022-04-07 ASSESSMENT — PAIN SCALES - GENERAL: PAINLEVEL_OUTOF10: 4

## 2022-04-07 ASSESSMENT — PAIN DESCRIPTION - FREQUENCY: FREQUENCY: CONTINUOUS

## 2022-04-07 ASSESSMENT — PAIN DESCRIPTION - ORIENTATION: ORIENTATION: LEFT

## 2022-04-07 ASSESSMENT — PAIN DESCRIPTION - PAIN TYPE: TYPE: ACUTE PAIN

## 2022-04-07 NOTE — Clinical Note
Danny Strange was seen and treated in our emergency department on 4/7/2022. He may return to work on 04/08/2022. If you have any questions or concerns, please don't hesitate to call.       Viktoria Allen MD

## 2022-04-07 NOTE — ED PROVIDER NOTES
[facility] ED  Emergency Department Encounter  Emergency Medicine Resident     Pt Name: Alex Archuleta  MRN: [id number]  [place] 2000  Date of evaluation: 4/7/22  PCP:  No primary care provider on file. This patient was evaluated in the Emergency Department for symptoms described in the history of present illness. The patient was evaluated in the context of the global COVID-19 pandemic, which necessitated consideration that the patient might be at risk for infection with the SARS-CoV-2 virus that causes COVID-19. Institutional protocols and algorithms that pertain to the evaluation of patients at risk for COVID-19 are in a state of rapid change based on information released by regulatory bodies including the CDC and federal and state organizations. These policies and algorithms were followed during the patient's care in the ED. CHIEF COMPLAINT       Chief Complaint   Patient presents with    Other     shock like feeling  to left side of body couldn't go back to sleep     HISTORY OFPRESENT ILLNESS  (Location/Symptom, Timing/Onset, Context/Setting, Quality, Duration, Modifying Factors,Severity.)      Alex Archuleta is a 23 y.o. male who presents with an electric, shocklike sensation to his bilateral upper extremities and chest which occurred just prior to arrival when he woke from sleep. Patient tried to go back to sleep but this sensation occurred again. He states that it only lasted for about 15 seconds. He did complain of chest pain at the time this occurred, but states that currently he has no chest pain or shortness of breath. No diaphoresis associated. No nausea vomiting or abdominal pain. No fevers at home or URI symptoms. He denies any recent head injuries but does note that he played football over a year ago. He denies any drug or alcohol use tonight. He does admit that he was vaping tonight as well as working out.  Patient does have Tylenol 3 as listed as outpatient medication, but he states he has not taken this in [facility] long time. \"    PAST MEDICAL / SURGICAL / SOCIAL / FAMILY HISTORY     Denies past medical or surgical history on exam.    Social History     Socioeconomic History    Marital status: Single     Spouse name: Not on file    Number of children: Not on file    Years of education: Not on file    Highest education level: Not on file   Occupational History    Not on file   Tobacco Use    Smoking status: Never Smoker    Smokeless tobacco: Never Used   Vaping Use    Vaping Use: Every day   Substance and Sexual Activity    Alcohol use: Yes    Drug use: Yes     Types: Marijuana Mounika Ing)    Sexual activity: Yes   Other Topics Concern    Not on file   Social History Narrative    Not on file     Social Determinants of Health     Financial Resource Strain:     Difficulty of Paying Living Expenses: Not on file   Food Insecurity:     Worried About Running Out of Food in the Last Year: Not on file    Clarisse of Food in the Last Year: Not on file   Transportation Needs:     Lack of Transportation (Medical): Not on file    Lack of Transportation (Non-Medical):  Not on file   Physical Activity:     Days of Exercise per Week: Not on file    Minutes of Exercise per Session: Not on file   Stress:     Feeling of Stress : Not on file   Social Connections:     Frequency of Communication with Friends and Family: Not on file    Frequency of Social Gatherings with Friends and Family: Not on file    Attends Yazidi Services: Not on file    Active Member of Clubs or Organizations: Not on file    Attends Club or Organization Meetings: Not on file    Marital Status: Not on file   Intimate Partner Violence:     Fear of Current or Ex-Partner: Not on file    Emotionally Abused: Not on file    Physically Abused: Not on file    Sexually Abused: Not on file   Housing Stability:     Unable to Pay for Housing in the Last Year: Not on file    Number of [place] in the Last Year: Not on file    Unstable Housing in the Last Year: Not on file     History reviewed. No pertinent family history. Allergies:  Patient has no known allergies. Home Medications:  Prior to Admission medications    Medication Sig Start Date End Date Taking? Authorizing Provider   ibuprofen (ADVIL;MOTRIN) 600 MG tablet Take 1 tablet by mouth every 8 hours as needed for Pain 10/11/19 10/15/19  Annmarie MARTINEZ Jerome, APRN - CNP   acetaminophen-codeine (TYLENOL #3) 300-30 MG per tablet Take 1 tablet by mouth every 4 hours as needed for Pain 11/18/16   Walter Rosanna, APRN - CNP     REVIEW OF SYSTEMS    (2-9 systems for level 4, 10 or more for level 5)      Review of Systems   Constitutional: Negative for activity change, appetite change and fever. HENT: Negative for congestion and rhinorrhea. Eyes: Negative for redness. Respiratory: Negative for cough and shortness of breath. Cardiovascular: Positive for chest pain. Gastrointestinal: Negative for abdominal distention, abdominal pain, diarrhea, nausea and vomiting. Genitourinary: Negative for dysuria. Musculoskeletal: Positive for neck pain (tight). Skin: Negative for rash and wound. Neurological: Negative for headaches. PHYSICAL EXAM   (up to 7 for level 4, 8 or more for level 5)     INITIAL VITALS:    height is 5' 4\" (1.626 m) and weight is 146 lb 9.6 oz (66.5 kg). His oral temperature is 99.4 °F (37.4 °C). His blood pressure is 122/89 and his pulse is 78. His respiration is 16 and oxygen saturation is 98%. Physical Exam  Constitutional:       Appearance: Normal appearance. HENT:      Head: Normocephalic. Right Ear: Tympanic membrane normal.      Left Ear: Tympanic membrane normal.      Nose: Nose normal. No congestion or rhinorrhea. Mouth/Throat:      Mouth: Mucous membranes are moist.   Eyes:      Extraocular Movements: Extraocular movements intact. Pupils: Pupils are equal, round, and reactive to light.       Comments: Pupils 4-5mm equally reactive to light   Cardiovascular:      Rate and Rhythm: Normal rate and regular rhythm. Pulses: Normal pulses. Heart sounds: Normal heart sounds. No murmur heard. Pulmonary:      Effort: Pulmonary effort is normal.      Breath sounds: Normal breath sounds. No wheezing. Abdominal:      General: There is no distension. Palpations: Abdomen is soft. Tenderness: There is no abdominal tenderness. There is no guarding. Musculoskeletal:      Cervical back: Normal range of motion and neck supple. No rigidity or tenderness. Lymphadenopathy:      Cervical: No cervical adenopathy. Skin:     Capillary Refill: Capillary refill takes less than 2 seconds. Neurological:      General: No focal deficit present. Mental Status: He is alert and oriented to person, place, and time. Cranial Nerves: No cranial nerve deficit. Sensory: No sensory deficit. Motor: No weakness. Comments: Negative axial load. No focal neurological deficit. Patient without any sensory change or deficit. Strength is 5 out of 5 in bilateral upper and lower extremities. No cranial nerve deficit with intact extraocular movements, eyebrow raise, smile without facial droop, uvula midline, normal tongue deviation. DIFFERENTIAL  DIAGNOSIS     PLAN (LABS / IMAGING / EKG):  Orders Placed This Encounter   Procedures    XR CHEST PORTABLE    Troponin    Brain Natriuretic Peptide    CBC with Auto Differential    Basic Metabolic Panel    Magnesium    EKG 12 Lead     MEDICATIONS ORDERED:  No orders of the defined types were placed in this encounter. DDX: Radiculopathy, metabolic disturbance    Initial MDM/Plan: 23 y.o. male who presents with acute onset electric type sensation throughout bilateral upper extremities which occurred for approximately 15 seconds. The sensation did occur twice. Patient also described chest pain episode tonight with the electrical type sensation. Will obtain ACS work-up as well as evaluate electrolytes to assess for metabolic derangement which be causing his symptoms. Anticipate discharge home. DIAGNOSTIC RESULTS / EMERGENCY DEPARTMENT COURSE / MDM     LABS:  Labs Reviewed   BASIC METABOLIC PANEL - Abnormal; Notable for the following components:       Result Value    Glucose 100 (*)     Chloride 97 (*)     All other components within normal limits   TROPONIN   BRAIN NATRIURETIC PEPTIDE   CBC WITH AUTO DIFFERENTIAL   MAGNESIUM       RADIOLOGY:  XR CHEST PORTABLE    Result Date: 4/7/2022  EXAMINATION: ONE XRAY VIEW OF THE CHEST 4/7/2022 4:24 am COMPARISON: 01/12/2022. HISTORY: ORDERING SYSTEM PROVIDED HISTORY: chest pain TECHNOLOGIST PROVIDED HISTORY: chest pain Reason for Exam: chest pain and tremors   upright port Initial evaluation. FINDINGS: The cardiac silhouette appears within normal limits for size given portable technique. No convincing evidence of a focal consolidation. No pleural effusion or pneumothorax seen. No acute cardiopulmonary abnormality. EKG  Normal sinus rhythm with slight sinus arrhythmia. 69 bpm.  Normal axis. Normal intervals with WA interval at 128 ms, QRS 80 ms and QTC of 4 7 ms. There are no ST segment elevation or depression. No T wave inversions or abnormalities noted. Normal EKG. All EKG's are interpreted by the Emergency Department Physician who either signs or Co-signs this chart in the absence of a cardiologist.    EMERGENCY DEPARTMENT COURSE:  Patient is a 26-year-old male presenting to the emergency department for evaluation of electric shocklike sensation throughout bilateral upper extremities are also associated with  brief episode of chest pain. Patient states that he was unable to go back to sleep and this episode did occur for second time prompting him to call EMS for evaluation at the emergency department.   Upon initial assessment, patient is hemodynamically stable without sign of trauma or injury. He denies any injury or substance use tonight aside from vaping. He does have 5 mm pupils but are equally reactive to light bilaterally. No focal neurological deficits on examination. Unable to elicit similar sensation with axial loading. No tenderness throughout the entirety of the CT LS spine. ACS work-up was obtained as well as metabolic evaluation. Work-up returned negative for ischemic event and patient without metabolic disturbance at this time. Patient states he did have 1 brief similar episode while in the emergency department and he was looking down at his phone with his neck coming forward. Discussed avoiding extreme flexion or extension of his neck as this could be contributing to his symptoms. Recommended patient establish care with [facility] clinic for further evaluation and primary care physician. Results reviewed with patient and recommendations for return were reviewed with patient including any increasing or prolonged symptoms, new onset headaches, new neurological deficits, vision changes, inability to tolerate p.o. or persistent nausea and vomiting. PROCEDURES:  None    CONSULTS:  None    CRITICAL CARE:  Please see attending note    FINAL IMPRESSION      1.  Electrical shock sensation          DISPOSITION / PLAN     DISPOSITION Decision To Discharge 04/07/2022 05:18:02 AM    Home    PATIENTREFERRED TO:  [address] [address] [phone]  Schedule an appointment as soon as possible for a visit   to establish primary care    [facility]  [address] [place] [address]  [phone]    As needed      DISCHARGE MEDICATIONS:  Discharge Medication List as of 4/7/2022  5:22 AM          Seymour Mathis MD  Emergency Medicine Resident    (Please note that portions of this note were completed with a voice recognition program.  Efforts were made to edit the dictations but occasionally words are mis-transcribed.)        Virgilio Client, MD  Resident  04/07/22 0139

## 2022-04-07 NOTE — ED PROVIDER NOTES
Patient assigned to me in error. I did not participate in evaluation or care of this patient during this encounter.              Rissa Thorpe MD  Resident  04/07/22 [id number]

## 2022-04-12 NOTE — ED PROVIDER NOTES
[facility] ED  Emergency Department  Faculty Attestation       I performed a history and physical examination of the patient and discussed management with the resident. I reviewed the residents note and agree with the documented findings including all diagnostic interpretations and plan of care. Any areas of disagreement are noted on the chart. I was personally present for the key portions of any procedures. I have documented in the chart those procedures where I was not present during the key portions. I have reviewed the emergency nurses triage note. I agree with the chief complaint, past medical history, past surgical history, allergies, medications, social and family history as documented unless otherwise noted below. Documentation of the HPI, Physical Exam and Medical Decision Making performed by [email or web address] is based on my personal performance of the HPI, PE and MDM. For Physician Assistant/ Nurse Practitioner cases/documentation I have personally evaluated this patient and have completed at least one if not all key elements of the E/M (history, physical exam, and MDM). Additional findings are as noted. Pertinent Comments     Primary Care Physician: No primary care provider on file. History: This is a 23 y.o. male who presents to the Emergency Department with complaint of a shocklike sensation that woke him up from his sleep. Patient states that he had what felt like electric shock go down both arms and into his chest and was associated with brief chest pain that is now resolved. n patient had this occur for 2 separate episodes each approximately 15 to 30 seconds in duration. He denies any fever or cough. Denies any recent falls. Denies any neck pain. Denies any significant history of cardiac disease. Denies any history of MS in his family or any form neuro disease that he is aware of.     Physical:    ED Triage Vitals [04/07/22 0404]   BP Temp Temp Source Pulse Resp SpO2 Height Weight   122/89 99.4 °F (37.4 °C) Oral 78 16 98 % 5' 4\" (1.626 m) 146 lb 9.6 oz (66.5 kg)        General: alert, well appearing, and in no distress. HENT: normocephalic, moist mucus membranes  Eyes: pupils equal and reactive, extraocular eye movements intact   Neck: normal ROM, trachea midline. No midline neck tenderness. Normal flexion and extension. No reproduction of symptoms with axial loading. And no meningeal signs. Heart:  normal rate, regular rhythm, normal S1, S2, no murmurs, rubs, clicks or gallops   Chest: clear to auscultation, no wheezes, rales or rhonchi, symmetric air entry. Abdomen: soft, nontender, nondistended, no masses or organomegaly  no pulsatile masses   Back: No midline thoracic or lumbar tenderness. No step-off or deformities. Extremities: no obvious deformities, normal ROM of all 4 extremities, no edema of bilateral lower extremities  Neurological: alert, oriented, normal speech, no focal findings or movement disorder. He has 5 out of 5 strength and sensation in all 4 extremities and a normal gait. Skin: normal coloration and turgor, no rashes on exposed skin       MDM/Plan:   Patient with shocklike sensation down his arms into his chest that was brief and happened 2 times. Not able to reproduce it on exam.   No shocklike sensation when providing axial loading, and no other clinical signs or symptoms of a chronic neuro disease such as MS. This is likely a musculoskeletal  issue, however given that it did involve some chest pain. Basic labs were obtained as well as EKG. EKG Interpretation    Interpreted by emergency department physician    Clinical Impression: Normal sinus with sinus arrhythmia. No signs of acute ischemia.     Lukas Cabrera MD      Critical Care Time: None     Lukas Cabrera MD  Attending Emergency Physician        Lukas Cabrera MD  04/12/22 2225